# Patient Record
(demographics unavailable — no encounter records)

---

## 2025-05-12 NOTE — HISTORY OF PRESENT ILLNESS
[Born at ___ Wks Gestation] : The patient was born at [unfilled] weeks gestation [[facility]] : [facility] [Breast milk] : breast milk [Formula ___ oz/feed] : [unfilled] oz of formula per feed [Hours between feeds ___] : Child is fed every [unfilled] hours [Normal] : Normal [Frequency of stools: ___] : Frequency of stools: [unfilled]  stools [per day] : per day. [Yellow] : yellow [Loose] : loose consistency [In Bassinet/Crib] : sleeps in bassinet/crib [On back] : sleeps on back [Co-sleeping] : co-sleeping [Pacifier] : Uses pacifier [No] : No cigarette smoke exposure [Rear facing car seat in back seat] : Rear facing car seat in back seat [Carbon Monoxide Detectors] : Carbon monoxide detectors at home [Smoke Detectors] : Smoke detectors at home. [NO] : No [] : via normal spontaneous vaginal delivery [(1) _____] : [unfilled] [(5) _____] : [unfilled] [None] : There were no delivery complications [BW: _____] : weight of [unfilled] [Length: _____] : length of [unfilled] [HC: _____] : head circumference of [unfilled] [DW: _____] : Discharge weight was [unfilled] [Time of Birth: _____] : Time of birth was [unfilled] [Age: ___] : [unfilled] year old mother [G: ___] : G [unfilled] [P: ___] : P [unfilled] [Significant Hx: ____] : The mother's  medical history is significant for [unfilled] [Rubella (Immune)] : Rubella immune [TcB: _____] : Transcutaneous Bilirubin [unfilled] mg/dL [Phototherapy Threshold: _____] : Phototherapy level per Bilitool [unfilled] (mg/dL) [Yes] : Yes [___ voids per day] : [unfilled] voids per day [Water heater temperature set at <120 degrees F] : Water heater temperature set at <120 degrees F [RSV vaccine] : RSV vaccine not received by mother at least 14 days prior to delivery [HepBsAG] : HepBsAg negative [HIV] : HIV negative [GBS] : GBS negative [VDRL/RPR (Reactive)] : VDRL/RPR nonreactive [] : Circumcision: No [de-identified] : 23 [FreeTextEntry8] : 39 2/7 week male infant, AGA, born via  to a 22y/o  mother. Maternal history of chlamydia (treated w.negative AGATA), alpha thalassemia silent carrier. Apgars were 9 and 9 at 1 and 5 minutes respectively. Hepatitis B vaccine was refused. Passed hearing B/L. Maternal blood type O+, Infant O+, Eyal -. Transcutaneous bilirubin at 23 HOL 6.2, PT 12.7. Prenatal labs were negative. Maternal UDS was not performed. Congenital heart disease screening was passed. [facility]  Screening #[id number]. In view of B/L ear pits, renal U/S and cardiac echo performed. Renal U/S WNL. Cardiac echo revealed PFO.  Birth weight: 3080 grams; 23%ile Birth length: 48 cm; 14%ile Birth head circumference: 32 cm; 3%ile > repeat at 24 hours of life 34 cm (32%) Discharge weight 25, 2935g down 4.7%  [Vitamins ___] : Patient takes no vitamins [Loose bedding, pillow, toys, and/or bumpers in crib] : no loose bedding, pillow, toys, and/or bumpers in crib [Exposure to electronic nicotine delivery system] : No exposure to electronic nicotine delivery system [Hepatitis B Vaccine Given] : Hepatitis B vaccine not given [Nirsevimab Given] : Nirsevimab not given [FreeTextEntry7] : Discharged 5/9/25 [de-identified] : None  [de-identified] : Formula - Similac 360. Primarily breastmilk.  [FreeTextEntry9] : Alert between naps [de-identified] : Requests Hep B vaccine today [FreeTextEntry1] : SDOH Screening Questionnaire  SDOH (Social Determinants of Health) Questionnaire:  1. Housing: Do you worry that in the upcoming months, your family, or child, may not have a safe or stable place to live?no  2. Food security: Within the last 12 months, did the food you bought not last and you did not have money to buy more?no  3. Community: Do you need help getting public benefits like food stamps or WIC?no  4. Transportation: Does your child have chronic medical condition and therefore struggle with transportation to attend medical appointments?no  5. Healthcare Access: Do you need help getting health or dental insurance? no    Result: Negative Screen. No further intervention needed.

## 2025-05-12 NOTE — DEVELOPMENTAL MILESTONES
[Normal Development] : Normal Development [None] : none [Makes brief eye contact] : makes brief eye contact [Cries with discomfort] : cries with discomfort [Calms to adult voice] : calms to adult voice [Reflexively moves arms and legs] : reflexively moves arms and legs [Holds fingers closed] : holds fingers closed [Passed] : passed [FreeTextEntry2] : 2

## 2025-05-12 NOTE — BEGINNING OF VISIT
[Mother] : mother [] :  [Language Line ] : provided by Language Line   [Interpreters_IDNumber] : [id number] [Interpreters_FullName] : Noe [TWNoteComboBox1] : Samoan

## 2025-05-12 NOTE — DISCUSSION/SUMMARY
[Normal Growth] : growth [Normal Development] : developmental [No Elimination Concerns] : elimination [Continue Regimen] : feeding [No Skin Concerns] : skin [Normal Sleep Pattern] : sleep [Term Infant] : term infant [None] : no known medical problems [Anticipatory Guidance Given] : Anticipatory guidance addressed as per the history of present illness section [ Transition] :  transition [ Care] :  care [Nutritional Adequacy] : nutritional adequacy [Parental Well-Being] : parental well-being [Safety] : safety [No Vaccines] : no vaccines needed [No Medications] : ~He/She~ is not on any medications [Parent/Guardian] : Parent/Guardian [Hepatitis B In Hospital] : Hepatitis B not administered while in the hospital [] : The components of the vaccine(s) to be administered today are listed in the plan of care. The disease(s) for which the vaccine(s) are intended to prevent and the risks have been discussed with the caretaker.  The risks are also included in the appropriate vaccination information statements which have been provided to the patient's caregiver.  The caregiver has given consent to vaccinate. [FreeTextEntry1] : Mohan is a 5 day old male born FT via  presenting for HCM. Maternal prenatal labs negative. Growth and development normal. Loss from birth weight 2.3%, within appropriate range, gaining 25g/day since discharge. PE remarkable for multiple bilateral ear pits. Renal US in nursery WNL. Maternal depression screen passed. CCHD and hearing screens passed. NBS pending.  - Routine  care & anticipatory guidance given - Continue ad maria isabel feeds at least every 3 hours - Hepatitis B vaccine administered today  - TcB 14.1, PT 21.5 at 108 HOL without risk factors; serum level sent, shall call to follow up results  - Polyvisol as prescribed - Follow up NBS [id number] - RTC 7 days for weight check and prn - RTC for 1 month HCM and prn - Discussed STRICT precautions for seeking immediate medical attention including but not limited to fever of 100.4F or more, yellowing or increased yellowing of skin or eyes, redness, discharge or foul odor from umbilical stump, poor feeding, lethargy or decreased responsiveness, fast or labored breathing, less than 5 wet diapers daily, rash or any other concerning sign or symptom.  Caretaker expressed understanding of the plan and agrees. All questions were answered.

## 2025-05-12 NOTE — PHYSICAL EXAM
[Alert] : alert [Normocephalic] : normocephalic [Flat Open Anterior [place]] : flat open anterior fontanelle [PERRL] : PERRL [Red Reflex Bilateral] : red reflex bilateral [Normally Placed Ears] : normally placed ears [Auricles Well Formed] : auricles well formed [Clear Tympanic membranes] : clear tympanic membranes [Light reflex present] : light reflex present [Bony structures visible] : bony structures visible [Patent Auditory Canal] : patent auditory canal [Nares Patent] : nares patent [Palate Intact] : palate intact [Uvula Midline] : uvula midline [Supple, full passive range of motion] : supple, full passive range of motion [Symmetric Chest Rise] : symmetric chest rise [Clear to Auscultation Bilaterally] : clear to auscultation bilaterally [Regular Rate and Rhythm] : regular rate and rhythm [S1, S2 present] : S1, S2 present [+2 Femoral Pulses] : +2 femoral pulses [Soft] : soft [Bowel Sounds] : bowel sounds present [Umbilical Stump Dry, Clean, Intact] : umbilical stump dry, clean, intact [Normal external genitailia] : normal external genitalia [Central Urethral Opening] : central urethral opening [Testicles Descended Bilaterally] : testicles descended bilaterally [Patent] : patent [Normally Placed] : normally placed [No Abnormal Lymph Nodes Palpated] : no abnormal lymph nodes palpated [Symmetric Flexed Extremities] : symmetric flexed extremities [Startle Reflex] : startle reflex present [Suck Reflex] : suck reflex present [Rooting] : rooting reflex present [Palmar Grasp] : palmar grasp present [Plantar Grasp] : plantar reflex present [Symmetric Fernando] : symmetric [place] [Dermal Melanocytosis] : Dermal Melanocytosis [Acute Distress] : no acute distress [Icteric sclera] : nonicteric sclera [Discharge] : no discharge [Palpable Masses] : no palpable masses [Murmurs] : no murmurs [Tender] : nontender [Distended] : not distended [Hepatomegaly] : no hepatomegaly [Splenomegaly] : no splenomegaly [Circumcised] : not circumcised [Hill-Ortolani] : negative Hill-Ortolani [Spinal Dimple] : no spinal dimple [Tuft of Hair] : no tuft of hair [Jaundice] : not jaundice [FreeTextEntry3] : B/l ear pits +  [de-identified] : + sacral Jamaican

## 2025-05-20 NOTE — PHYSICAL EXAM
[NL] : warm, clear [Normal External Genitalia] : normal external genitalia [Circumcised] : uncircumcised [Undescended Testicle] : descended testicle [Patent] : patent

## 2025-05-20 NOTE — BEGINNING OF VISIT
[Mother] : mother [] :  [Interpreters_IDNumber] : [id number] [Interpreters_FullName] : Katie  [TWNoteComboBox1] : Cypriot

## 2025-05-20 NOTE — HISTORY OF PRESENT ILLNESS
[de-identified] : weight check [FreeTextEntry6] : This is a 13-day old Male presenting to the clinic for a weight check. Mother expresses concern for congestion. Mother mentions listening to the patient breathe x1 day ago while he was crying and appreciated "weird sound". Mother states that sound has not yet gone away. Mother comments that whenever he feeds he appears to be breathing faster. The patient is both breast and formula fed. Patient is feeding at baseline. Mother denies any current medication. NKDA. Mother denies patient of fever, rash, N/V/D, constipation, or exposure to sick contacts.

## 2025-05-20 NOTE — DISCUSSION/SUMMARY
[FreeTextEntry1] : 13 day-old male born FT via  presenting for weight check. Growth and development normal. Has regained birthweight. PE unremarkable. TCB 8.9, downtrending for 11.3 previously. Advised continuation of current feeding regimen, supplement with Vitamin D daily. Noisy breathing likely nasal congestion which is normal in the  period, can trial nasal saline drops and suction PRN or prior to feeds. Monitor for additional signs such as poor feeding, lethargy, fevers, etc. RTC for 1 month HCM or PRN.   Caretaker expressed understanding of the plan and agrees. All questions were answered.

## 2025-06-10 NOTE — DISCUSSION/SUMMARY
[FreeTextEntry1] :  1 month old M presenting for HCM. Growth and development normal. PE only remarkable for sacral dermal melanocytosis. Maternal depression screen passed. Immunizations UTD. Patient growing well, length now in the 47%, weight in the 63%, increased from 32%, head circumference in the 50%, improved from 23%.   - Routine care & anticipatory guidance given - Continue ad maria isabel feeds - RTC for 2 month old HCM and prn  Caretaker expressed understanding of the plan and agrees. All questions were answered.

## 2025-06-10 NOTE — PHYSICAL EXAM
[Alert] : alert [Acute Distress] : no acute distress [Normocephalic] : normocephalic [Flat Open Anterior [place]] : flat open anterior fontanelle [PERRL] : PERRL [Red Reflex Bilateral] : red reflex bilateral [Normally Placed Ears] : normally placed ears [Auricles Well Formed] : auricles well formed [Clear Tympanic membranes] : clear tympanic membranes [Light reflex present] : light reflex present [Bony landmarks visible] : bony landmarks visible [Discharge] : no discharge [Nares Patent] : nares patent [Palate Intact] : palate intact [Uvula Midline] : uvula midline [Supple, full passive range of motion] : supple, full passive range of motion [Palpable Masses] : no palpable masses [Symmetric Chest Rise] : symmetric chest rise [Clear to Auscultation Bilaterally] : clear to auscultation bilaterally [Regular Rate and Rhythm] : regular rate and rhythm [S1, S2 present] : S1, S2 present [Murmurs] : no murmurs [+2 Femoral Pulses] : +2 femoral pulses [Soft] : soft [Tender] : nontender [Distended] : not distended [Bowel Sounds] : bowel sounds present [Hepatomegaly] : no hepatomegaly [Splenomegaly] : no splenomegaly [Normal external genitailia] : normal external genitalia [Central Urethral Opening] : central urethral opening [Testicles Descended Bilaterally] : testicles descended bilaterally [Normally Placed] : normally placed [No Abnormal Lymph Nodes Palpated] : no abnormal lymph nodes palpated [Hill-Ortolani] : negative Hill-Ortolani [Symmetric Flexed Extremities] : symmetric flexed extremities [Tuft of Hair] : no tuft of hair [Spinal Dimple] : no spinal dimple [Startle Reflex] : startle reflex present [Suck Reflex] : suck reflex present [Rooting] : rooting reflex present [Palmar Grasp] : palmar grasp reflex present [Plantar Grasp] : plantar grasp reflex present [Symmetric Fernando] : symmetric [place] [Jaundice] : no jaundice [Rash and/or lesion present] : no rash/lesion [Dermal Melanocytosis] : Dermal Melanocytosis [de-identified] : + sacral dermal melanocytosis

## 2025-06-10 NOTE — DEVELOPMENTAL MILESTONES
[Calms when picked up or spoken to] : calms when picked up or spoken to [Alerts to unexpected sound] : alerts to unexpected sound [Makes brief short vowel sounds] : makes brief short vowel sounds [Looks briefly at objects] : looks briefly at objects [Holds chin up in prone] : holds chin up in prone [Holds fingers more open at rest] : does not hold fingers more open at rest

## 2025-06-10 NOTE — HISTORY OF PRESENT ILLNESS
[Parents] : parents [Breast milk] : breast milk [Formula ___ oz/feed] : [unfilled] oz of formula per feed [Formula ___ oz in 24hrs] : [unfilled] oz of formula in 24 hours [___ Feeding per 24 hrs] : a  total of [unfilled] feedings in 24 hours [Normal] : Normal [___ voids per day] : [unfilled] voids per day [Frequency of stools: ___] : Frequency of stools: [unfilled]  stools [per day] : per day. [On back] : sleeps on back [Co-sleeping] : co-sleeping [Pacifier use] : Pacifier use [No] : No cigarette smoke exposure [Water heater temperature set at <120 degrees F] : Water heater temperature set at <120 degrees F [Rear facing car seat in back seat] : Rear facing car seat in back seat [NO] : No [Exposure to electronic nicotine delivery system] : No exposure to electronic nicotine delivery system [Carbon Monoxide Detectors] : No carbon monoxide detectors at home [Smoke Detectors] : no smoke detectors at home. [de-identified] : similac 360 [FreeTextEntry1] :  SDOH Screening Questionnaire  SDOH (Social Determinants of Health) Questionnaire: 1. Housing: Do you worry that in the upcoming months, your family, or child, may not have a safe or stable place to live? 2. Food security: Within the last 12 months, did the food you bought not last and you did not have money to buy more? 3. Community: Do you need help getting public benefits like food stamps or WIC? 4. Transportation: Does your child have chronic medical condition and therefore struggle with transportation to attend medical appointments? 5. Healthcare Access: Do you need help getting health or dental insurance?    Result: Negative Screen. No further intervention needed.

## 2025-07-08 NOTE — DEVELOPMENTAL MILESTONES
[None] : none [Smiles responsively] : smiles responsively [Vocalizes with simple cooing] : vocalizes with simple cooing [Lifts head and chest in prone] : lifts head and chest in prone [Opens and shuts hands] : opens and shuts hands [Normal Development] : Normal Development [Passed] : passed

## 2025-07-15 NOTE — DISCUSSION/SUMMARY
[FreeTextEntry1] : In summary, ADRIEN is a 2 month old male here for h/o murmur and echo with atrial communication. His physical exam is normal. His limited echocardiogram suggests normal intracardiac anatomy with good biventricular systolic function and no effusion. Given these results and his clinical presentation, I provided reassurance and explained that ADRIEN appears to have a structurally normal heart. No further cardiac work up or follow up is necessary at this time. However, I would recommend re-evaluation if there are any new or worsening symptoms in the future.   Plan: - Return as needed for any new and/or worsening symptoms. - No activity restrictions. - No SBE prophylaxis.     Please do not hesitate to contact me if you have any questions.   Manny Leonardo MD, MS, FAAP, FACC Attending Physician, Pediatric Cardiology [facility] Physician [address], [address] [place] [address] Office: [phone] Fax: [phone] Email: [email or web address]     I have spent 50 minutes of time on the encounter excluding separately reported services.

## 2025-07-15 NOTE — CARDIOLOGY SUMMARY
[Today's Date] : [unfilled] [FreeTextEntry2] : Limited by agitation; grossly normal. No obvious ASD or PFO. Normal intracardiac anatomy.

## 2025-07-15 NOTE — PHYSICAL EXAM
[TextEntry] : Gen: Well appearing, comfortable. HEENT: Normal. CV: RRR, normal S1 and S2, no murmurs.  Resp: CTAB, no wheezing or rhonchi. Abd: Soft, non-tender and non-distended. BS present, no HSM. Ext: Cap refill < 2 seconds. 2+ pulses bilaterally. Skin: Pink and warm.

## 2025-07-15 NOTE — REVIEW OF SYSTEMS
[Nl] : no feeding issues at this time. [Acting Fussy] : not acting ~L fussy [Fever] : no fever [Wgt Loss (___ Lbs)] : no recent weight loss [Pallor] : not pale [Discharge] : no discharge [Redness] : no redness [Nasal Discharge] : no nasal discharge [Nasal Stuffiness] : no nasal congestion [Stridor] : no stridor [Cyanosis] : no cyanosis [Edema] : no edema [Diaphoresis] : not diaphoretic [Tachypnea] : not tachypneic [Wheezing] : no wheezing [Cough] : no cough [Being A Poor Eater] : not a poor eater [Vomiting] : no vomiting [Diarrhea] : no diarrhea [Decrease In Appetite] : appetite not decreased [Fainting (Syncope)] : no fainting [Dec Consciousness] :  no decrease in consciousness [Seizure] : no seizures [Hypotonicity (Flaccid)] : not hypotonic [Refusal to Bear Wgt] : normal weight bearing [Puffy Hands/Feet] : no hand/feet puffiness [Rash] : no rash [Hemangioma] : no hemangioma [Jaundice] : no jaundice [Wound problems] : no wound problems [Bruising] : no tendency for easy bruising [Swollen Glands] : no lymphadenopathy [Enlarged [place]] : the fontanelle was not enlarged [Hoarse Cry] : no hoarse cry [Failure To Thrive] : no failure to thrive [Penis Circumcised] : not circumcised [Undescended Testes] : no undescended testicle [Ambiguous Genitals] : genitals not ambiguous [Dec Urine Output] : no oliguria

## 2025-07-15 NOTE — HISTORY OF PRESENT ILLNESS
[FreeTextEntry1] : Dear Dr. Francisco Bullock,  I had the pleasure of seeing your patient, Mohan Thao, in my office today, 07/15/2025. As you know, he is a 2-month-old male referred to pediatric cardiology for evaluation of a previously noted atrial communication.  Mohan was born at 39 weeks via normal spontaneous vaginal delivery. APGAR scores were 9 and 9. A murmur was noted shortly after birth, and an initial echocardiogram--though limited, revealed a small atrial communication. Follow-up was recommended at 4 to 6 months of age.  Since birth, Mohan has been doing well. He is feeding appropriately, gaining weight, and has had no significant episodes of tachypnea, diaphoresis with feeds, or cyanosis. There have been no fevers or upper respiratory symptoms. Family history is negative for congenital heart disease, sudden or unexplained death, or known genetic syndromes.

## 2025-07-19 NOTE — PHYSICAL EXAM
[Alert] : alert [Normocephalic] : normocephalic [Flat Open Anterior [place]] : flat open anterior fontanelle [PERRL] : PERRL [Red Reflex Bilateral] : red reflex bilateral [Normally Placed Ears] : normally placed ears [Auricles Well Formed] : auricles well formed [Clear Tympanic membranes] : clear tympanic membranes [Light reflex present] : light reflex present [Bony landmarks visible] : bony landmarks visible [Nares Patent] : nares patent [Palate Intact] : palate intact [Uvula Midline] : uvula midline [Supple, full passive range of motion] : supple, full passive range of motion [Symmetric Chest Rise] : symmetric chest rise [Clear to Auscultation Bilaterally] : clear to auscultation bilaterally [Regular Rate and Rhythm] : regular rate and rhythm [S1, S2 present] : S1, S2 present [Soft] : soft [Bowel Sounds] : bowel sounds present [Normal external genitailia] : normal external genitalia [Central Urethral Opening] : central urethral opening [Testicles Descended Bilaterally] : testicles descended bilaterally [Normally Placed] : normally placed [No Abnormal Lymph Nodes Palpated] : no abnormal lymph nodes palpated [Symmetric Flexed Extremities] : symmetric flexed extremities [Startle Reflex] : startle reflex present [Suck Reflex] : suck reflex present [Rooting] : rooting reflex present [Palmar Grasp] : palmar grasp reflex present [Plantar Grasp] : plantar grasp reflex present [Symmetric Fernando] : symmetric [place] [Acute Distress] : no acute distress [Discharge] : no discharge [Palpable Masses] : no palpable masses [Murmurs] : no murmurs [Tender] : nontender [Distended] : not distended [Hepatomegaly] : no hepatomegaly [Splenomegaly] : no splenomegaly [Hill-Ortolani] : negative Hill-Ortolani [Spinal Dimple] : no spinal dimple [Tuft of Hair] : no tuft of hair [Rash and/or lesion present] : no rash/lesion

## 2025-07-19 NOTE — PHYSICAL EXAM
[Alert] : alert [Normocephalic] : normocephalic [Flat Open Anterior [place]] : flat open anterior fontanelle [PERRL] : PERRL [Red Reflex Bilateral] : red reflex bilateral [Normally Placed Ears] : normally placed ears [Auricles Well Formed] : auricles well formed [Clear Tympanic membranes] : clear tympanic membranes [Light reflex present] : light reflex present [Bony landmarks visible] : bony landmarks visible [Nares Patent] : nares patent [Palate Intact] : palate intact [Uvula Midline] : uvula midline [Supple, full passive range of motion] : supple, full passive range of motion [Symmetric Chest Rise] : symmetric chest rise [Clear to Auscultation Bilaterally] : clear to auscultation bilaterally [Regular Rate and Rhythm] : regular rate and rhythm [S1, S2 present] : S1, S2 present [Soft] : soft [Bowel Sounds] : bowel sounds present [Normal external genitailia] : normal external genitalia [Central Urethral Opening] : central urethral opening [Testicles Descended Bilaterally] : testicles descended bilaterally [Normally Placed] : normally placed [No Abnormal Lymph Nodes Palpated] : no abnormal lymph nodes palpated [Symmetric Flexed Extremities] : symmetric flexed extremities [Startle Reflex] : startle reflex present [Suck Reflex] : suck reflex present [Rooting] : rooting reflex present [Palmar Grasp] : palmar grasp reflex present [Plantar Grasp] : plantar grasp reflex present [Symmetric Fernando] : symmetric [place] [Acute Distress] : no acute distress [Discharge] : no discharge [Palpable Masses] : no palpable masses [Murmurs] : no murmurs [Tender] : nontender [Distended] : not distended [Hepatomegaly] : no hepatomegaly [Splenomegaly] : no splenomegaly [Hill-Ortolani] : negative Hill-Ortolani [Spinal Dimple] : no spinal dimple [Tuft of Hair] : no tuft of hair [Rash and/or lesion present] : no rash/lesion

## 2025-07-19 NOTE — BEGINNING OF VISIT
[Mother] : mother [Father] : father [] :  [Interpreters_IDNumber] : [id number] [FreeTextEntry1] : 7/8/2025  luis [id number] [TWNoteComboBox1] : Yemeni

## 2025-07-19 NOTE — BEGINNING OF VISIT
[Mother] : mother [Father] : father [] :  [Interpreters_IDNumber] : [id number] [FreeTextEntry1] : 7/8/2025  luis [id number] [TWNoteComboBox1] : [place]

## 2025-07-19 NOTE — HISTORY OF PRESENT ILLNESS
[Mother] : mother [Father] : father [Breast milk] : breast milk [Formula ___ oz/feed] : [unfilled] oz of formula per feed [Formula ___ oz in 24hrs] : [unfilled] oz of formula in 24 hours [Hours between feeds ___] : Child is fed every [unfilled] hours [___ Feeding per 24 hrs] : a  total of [unfilled] feedings in 24 hours [Normal] : Normal [___ voids per day] : [unfilled] voids per day [Frequency of stools: ___] : Frequency of stools: [unfilled]  stools [per day] : per day. [Yellow] : yellow [Loose] : loose consistency [In Bassinet/Crib] : sleeps in bassinet/crib [On back] : sleeps on back [Co-sleeping] : co-sleeping [No] : No cigarette smoke exposure [Water heater temperature set at <120 degrees F] : Water heater temperature set at <120 degrees F [Rear facing car seat in back seat] : Rear facing car seat in back seat [NO] : No [Vitamins ___] : no vitamins [Pacifier use] : not using pacifier [Exposure to electronic nicotine delivery system] : No exposure to electronic nicotine delivery system [Carbon Monoxide Detectors] : No carbon monoxide detectors at home [Smoke Detectors] : no smoke detectors at home. [At risk for exposure to TB] : Not at risk for exposure to Tuberculosis  [FreeTextEntry3] : bed  [FreeTextEntry1] : Two-month-old male infant with PMHx of PFO, presents with parents for a well-child check. Parents report no concerns regarding growth and development. The infant is currently both  and formula-fed with Enfamil. So far there has been no follow-up with cardiology regarding the previously diagnosed patent foramen ovale (PFO) is outstanding.   SDOH Screening Questionnaire  SDOH (Social Determinants of Health) Questionnaire: 1. Housing: Do you worry that in the upcoming months, your family, or child, may not have a safe or stable place to live? no 2. Food security: Within the last 12 months, did the food you bought not last and you did not have money to buy more? no 3. Community: Do you need help getting public benefits like food stamps or WIC? no 4. Transportation: Does your child have chronic medical condition and therefore struggle with transportation to attend medical appointments? no 5. Healthcare Access: Do you need help getting health or dental insurance? no    Result: Negative Screen. No further intervention needed.

## 2025-07-19 NOTE — DISCUSSION/SUMMARY
[Normal Growth] : growth [Normal Development] : development  [No Elimination Concerns] : elimination [Continue Regimen] : feeding [No Skin Concerns] : skin [Normal Sleep Pattern] : sleep [Anticipatory Guidance Given] : Anticipatory guidance addressed as per the history of present illness section [Age Approp Vaccines] : Age appropriate vaccines administered [No Medications] : ~He/She~ is not on any medications [FreeTextEntry1] : Two-month-old male infant with a past medical history of patent foramen ovale presents for a well-child check. He demonstrates appropriate growth and development, with length at the 25th percentile, weight at the 91st percentile, and head circumference at the 44th percentile. Physical exam was unremarkable, including no audible murmur. Referral to cardiology for PFO follow-up is planned at 4-6 months of age. Immunizations UTD.  Recommend exclusive breastfeeding, 8-12 feedings per day. Mother should continue prenatal vitamins and avoid alcohol. If formula is needed, recommend iron-fortified formulations, 2-4 oz every 3-4 hrs. When in car, patient should be in rear-facing car seat in back seat. Put baby to sleep on back, in own crib with no loose or soft bedding. Help baby to maintain sleep and feeding routines. May offer pacifier if needed. Continue tummy time when awake. Parents counseled to call if rectal temperature >100.4 degrees F.  PLAN: - cardiology referral for FU PFO at 4-6 months of age  - Routine care & anticipatory guidance given - Vaccines given: Pediarix, Hib,Prevnar & Rotarix - Post vaccine care discussed & potential side effects reviewed - Tylenol every 4 hours prn for fever or pain - Continue ad maria isabel feeds - RTC for 4 month old HCM and prn  Caretaker expressed understanding of the plan and agrees. All questions were answered.